# Patient Record
Sex: FEMALE | Race: OTHER | NOT HISPANIC OR LATINO | URBAN - METROPOLITAN AREA
[De-identification: names, ages, dates, MRNs, and addresses within clinical notes are randomized per-mention and may not be internally consistent; named-entity substitution may affect disease eponyms.]

---

## 2024-01-16 ENCOUNTER — EMERGENCY (EMERGENCY)
Facility: HOSPITAL | Age: 44
LOS: 1 days | Discharge: HOME | End: 2024-01-16
Attending: EMERGENCY MEDICINE
Payer: MEDICAID

## 2024-01-16 VITALS
RESPIRATION RATE: 16 BRPM | TEMPERATURE: 98 F | DIASTOLIC BLOOD PRESSURE: 80 MMHG | HEIGHT: 64 IN | OXYGEN SATURATION: 100 % | SYSTOLIC BLOOD PRESSURE: 140 MMHG | HEART RATE: 69 BPM | WEIGHT: 191.8 LBS

## 2024-01-16 PROCEDURE — 99283 EMERGENCY DEPT VISIT LOW MDM: CPT

## 2024-01-16 PROCEDURE — 99282 EMERGENCY DEPT VISIT SF MDM: CPT

## 2024-01-16 NOTE — ED PROVIDER NOTE - CLINICAL SUMMARY MEDICAL DECISION MAKING FREE TEXT BOX
No evidence of oropharyngeal pathology at this time. Character low suspicion for cardiopulmonary source to warrant workup at this time. Character could be consistent with that of oropharyngeal foreign body or scratch. Recommended CT to rule this out, however patient adamant that there is no possibility of this and declines. She is tolerating p.o. well and appears well-hydrated. Displays appropriate decision-making capacity, and states she will see her PMD tomorrow and get a referral for ENT/GI. Discharged with return precautions.

## 2024-01-16 NOTE — ED PROVIDER NOTE - NSFOLLOWUPINSTRUCTIONS_ED_ALL_ED_FT
Please follow up with your primary care doctor tomorrow, as well as ENT/GI doctor.  Please keep well hydrated.  Please return to the emergency department if you have worsening pain, chest pain, shortness of breath, vomiting, fever, or any other symptoms.    Pharyngitis    Upper body outline including the pharynx.   Pharyngitis is a sore throat (pharynx). This is when there is redness, pain, and swelling in your throat. Most of the time, this condition gets better on its own. In some cases, you may need medicine.    What are the causes?  •An infection from a virus.  •An infection from bacteria.  •Allergies.    What increases the risk?  •Being 5–24 years old.  •Being in crowded environments. These include:  •Daycares.  •Schools.  •Dormitories.  •Living in a place with cold temperatures outside.  •Having a weakened disease-fighting (immune) system.    What are the signs or symptoms?    Symptoms may vary depending on the cause. Common symptoms include:  •Sore throat.  •Tiredness (fatigue).  •Low-grade fever.  •Stuffy nose.  •Cough.  •Headache.    Other symptoms may include:  •Glands in the neck (lymph nodes) that are swollen.  •Skin rashes.  •Film on the throat or tonsils. This can be caused by an infection from bacteria.  •Vomiting.  •Red, itchy eyes.  •Loss of appetite.  •Joint pain and muscle aches.  •Tonsils that are temporarily bigger than usual (enlarged).    How is this treated?    Many times, treatment is not needed. This condition usually gets better in 3–4 days without treatment.    If the infection is caused by a bacteria, you may be need to take antibiotics.    Follow these instructions at home:    Medicines   •Take over-the-counter and prescription medicines only as told by your doctor.  •If you were prescribed an antibiotic medicine, take it as told by your doctor. Do not stop taking the antibiotic even if you start to feel better.  •Use throat lozenges or sprays to soothe your throat as told by your doctor.  •Children can get pharyngitis. Do not give your child aspirin.    Managing pain   A cup of hot tea.   To help with pain, try:•Sipping warm liquids, such as:  •Broth.  •Herbal tea.  •Warm water.  •Eating or drinking cold or frozen liquids, such as frozen ice pops.  •Rinsing your mouth (gargle) with a salt water mixture 3–4 times a day or as needed.  •To make salt water, dissolve ½–1 tsp (3–6 g) of salt in 1 cup (237 mL) of warm water.  •Do not swallow this mixture.  •Sucking on hard candy or throat lozenges.  •Putting a cool-mist humidifier in your bedroom at night to moisten the air.  •Sitting in the bathroom with the door closed for 5–10 minutes while you run hot water in the shower.    General instructions   A do not smoke cigarettes sign.   • Do not smoke or use any products that contain nicotine or tobacco. If you need help quitting, ask your doctor.  •Rest as told by your doctor.  •Drink enough fluid to keep your pee (urine) pale yellow.    How is this prevented?  •Wash your hands often for at least 20 seconds with soap and water. If soap and water are not available, use hand .  • Do not touch your eyes, nose, or mouth with unwashed hands. Wash hands after touching these areas.  • Do not share cups or eating utensils.  •Avoid close contact with people who are sick.    Contact a doctor if:  •You have large, tender lumps in your neck.  •You have a rash.  •You cough up green, yellow-brown, or bloody spit.    Get help right away if:  •You have a stiff neck.  •You drool or cannot swallow liquids.  •You cannot drink or take medicines without vomiting.  •You have very bad pain that does not go away with medicine.  •You have problems breathing, and it is not from a stuffy nose.  •You have new pain and swelling in your knees, ankles, wrists, or elbows.    These symptoms may be an emergency. Get help right away. Call your local emergency services (911 in the [place]   • Do not wait to see if the symptoms will go away.   • Do not drive yourself to the hospital.     Summary  •Pharyngitis is a sore throat (pharynx). This is when there is redness, pain, and swelling in your throat.  •Most of the time, pharyngitis gets better on its own. Sometimes, you may need medicine.  •If you were prescribed an antibiotic medicine, take it as told by your doctor. Do not stop taking the antibiotic even if you start to feel better.    This information is not intended to replace advice given to you by your health care provider. Make sure you discuss any questions you have with your health care provider.

## 2024-01-16 NOTE — ED ADULT TRIAGE NOTE - ARRIVAL FROM
E-prescription confirmation received. Patient was informed via [employer].   Home

## 2024-01-16 NOTE — ED PROVIDER NOTE - PHYSICAL EXAMINATION
Afebrile, hemodynamically stable, saturating well on room air  NAD, well appearing, sitting comfortably in bed, no WOB/tachypnea, speaking full sentences  No stridor/phonation changes/salivary pooling  Head NCAT  EOMI grossly, anicteric  MMM, mild erythema, airway widely patent, no foreign body  No neck tenderness or crepitus  RRR  Breathing comfortably on room air  AAO, CN's 3-12 grossly intact  UMANZOR spontaneously  Skin warm, well perfused, no rashes or hives

## 2024-01-16 NOTE — ED ADULT TRIAGE NOTE - CHIEF COMPLAINT QUOTE
Throat pain upon swallowing any food except for ice cream since Last christmas ,seen by PCP and Cardiologist ,was prescribed omeprazole and antacid with no relief

## 2024-01-16 NOTE — ED PROVIDER NOTE - PATIENT PORTAL LINK FT
You can access the FollowMyHealth Patient Portal offered by [facility] by registering at the following website: [email or web address]. By joining [employer]’s FollowMyHealth portal, you will also be able to view your health information using other applications (apps) compatible with our system. You can access the FollowMyHealth Patient Portal offered by [facility] by registering at the following website: [email or web address]. By joining [employer]’s FollowMyHealth portal, you will also be able to view your health information using other applications (apps) compatible with our system.